# Patient Record
Sex: FEMALE | Race: BLACK OR AFRICAN AMERICAN | NOT HISPANIC OR LATINO | Employment: FULL TIME | URBAN - METROPOLITAN AREA
[De-identification: names, ages, dates, MRNs, and addresses within clinical notes are randomized per-mention and may not be internally consistent; named-entity substitution may affect disease eponyms.]

---

## 2018-09-18 ENCOUNTER — HOSPITAL ENCOUNTER (EMERGENCY)
Facility: HOSPITAL | Age: 43
Discharge: HOME | End: 2018-09-18
Attending: EMERGENCY MEDICINE
Payer: MEDICAID

## 2018-09-18 VITALS
TEMPERATURE: 98 F | DIASTOLIC BLOOD PRESSURE: 73 MMHG | HEART RATE: 74 BPM | RESPIRATION RATE: 18 BRPM | HEIGHT: 67 IN | OXYGEN SATURATION: 100 % | WEIGHT: 170 LBS | SYSTOLIC BLOOD PRESSURE: 122 MMHG | BODY MASS INDEX: 26.68 KG/M2

## 2018-09-18 DIAGNOSIS — R21 FACIAL RASH: Primary | ICD-10-CM

## 2018-09-18 PROCEDURE — 99283 EMERGENCY DEPT VISIT LOW MDM: CPT

## 2018-09-18 RX ORDER — MUPIROCIN 20 MG/G
OINTMENT TOPICAL 2 TIMES DAILY
Qty: 15 G | Refills: 0 | Status: SHIPPED | OUTPATIENT
Start: 2018-09-18

## 2018-09-18 NOTE — ED PROVIDER NOTES
Encounter Date: 9/18/2018       History     Chief Complaint   Patient presents with    Facial Swelling     Pt reports ongoing facial swelling and pain related to possible infection after having eye brows waxed.      42yo F with pmh seizure disorder, with chief complaint facial rash x 1.5 months. Pt states had eyebrows waxed in July. Since that time she has noticed a rash developing to site of wax application. She also complaints rash is spreading to her ears as well. No fever. No wound drainage. No blistering. She does admit to pruritus. No significant pain. Symptoms constant. Pt admits to swelling in August, was seen at local , given PO abx, completed. Denies swelling since that time. No hx significant allergies. No radiation. No alleviating/exacerbating factors. Severity 2/10.          Review of patient's allergies indicates:  No Known Allergies  Past Medical History:   Diagnosis Date    Seizures      Past Surgical History:   Procedure Laterality Date    TUBAL LIGATION      pt reported    VAGINAL DELIVERY      pt reported      History reviewed. No pertinent family history.  Social History     Tobacco Use    Smoking status: Never Smoker    Smokeless tobacco: Never Used   Substance Use Topics    Alcohol use: Yes     Comment: occ     Drug use: No     Review of Systems   Constitutional: Negative for chills and fever.   HENT: Negative for sore throat.    Eyes: Negative.    Respiratory: Negative for shortness of breath.    Cardiovascular: Negative for chest pain.   Gastrointestinal: Negative for nausea.   Endocrine: Negative.    Genitourinary: Negative for dysuria.   Musculoskeletal: Negative for back pain, neck pain and neck stiffness.   Skin: Positive for rash.   Neurological: Negative for weakness and headaches.   Hematological: Does not bruise/bleed easily.   Psychiatric/Behavioral: Negative.    All other systems reviewed and are negative.      Physical Exam     Initial Vitals [09/18/18 0832]   BP Pulse  Resp Temp SpO2   122/73 74 18 98.2 °F (36.8 °C) 100 %      MAP       --         Physical Exam    Nursing note and vitals reviewed.  Constitutional: She appears well-developed and well-nourished. She is not diaphoretic. No distress.   Overall well-appearing, nontoxic. Resting comfortably on exam table.   HENT:   Head: Normocephalic and atraumatic.   Eyebrow regions with thickened, crusted skin. No open wound. No vesicular lesions. No martinez-crusted appearance. Bilateral jessenia with similar appearance. No ear canal or TM abnormality. Rash mildly ttp.    Eyes: Conjunctivae and EOM are normal. Pupils are equal, round, and reactive to light.   Neck: Normal range of motion. Neck supple. No tracheal deviation present.   Cardiovascular: Intact distal pulses.   Pulmonary/Chest: Breath sounds normal. No stridor. No respiratory distress. She has no wheezes.   Abdominal: Soft. Bowel sounds are normal. She exhibits no distension. There is no tenderness.   Musculoskeletal: Normal range of motion. She exhibits no tenderness.   Lymphadenopathy:     She has no cervical adenopathy.   Neurological: She is alert and oriented to person, place, and time.   Skin: Skin is warm and dry. Capillary refill takes less than 2 seconds.   Psychiatric: She has a normal mood and affect. Her behavior is normal. Judgment and thought content normal.         ED Course   Procedures  Labs Reviewed - No data to display       Imaging Results    None          Medical Decision Making:   Differential Diagnosis:   Impetigo, dermatitis, allergic reaction, fungal infection  ED Management:  Unsure of cause of rash. Given spread to ears, may be fungal or bacterial. Appearance more bacterial, will cover with topical mupirocin, refer to Derm. She does understand and agree. No signs of systemic infection. Return precautions given.                       Clinical Impression:   The encounter diagnosis was Facial rash.      Disposition:   Disposition:  Discharged  Condition: Stable                        Eloy Li PA-C  09/18/18 0927

## 2018-09-18 NOTE — ED TRIAGE NOTES
The pt states that she went to get her eye brows waxed in July 2018. Was seen in the ER in August 2018 because her face started swelling. She was placed on antibiotics and the pt completed the medication. Now her face hasn't cleared up, spread to her ears, and her eye brows are still swollen.